# Patient Record
Sex: FEMALE | Race: WHITE | NOT HISPANIC OR LATINO | Employment: OTHER | URBAN - METROPOLITAN AREA
[De-identification: names, ages, dates, MRNs, and addresses within clinical notes are randomized per-mention and may not be internally consistent; named-entity substitution may affect disease eponyms.]

---

## 2024-06-01 ENCOUNTER — MEDICAL CORRESPONDENCE (OUTPATIENT)
Dept: HEALTH INFORMATION MANAGEMENT | Facility: CLINIC | Age: 64
End: 2024-06-01

## 2024-06-04 ENCOUNTER — TRANSFERRED RECORDS (OUTPATIENT)
Dept: HEALTH INFORMATION MANAGEMENT | Facility: CLINIC | Age: 64
End: 2024-06-04
Payer: COMMERCIAL

## 2024-06-26 NOTE — OR NURSING
Pt concerned about cost of outpatient procedure vs outpatient in bed-staying overnight, instructed pt to call Dr. Henry's office to discuss options.  Pt states she had Dr. Henry's office number.

## 2024-06-28 RX ORDER — CALCIUM CARBONATE/VITAMIN D3 500-10/5ML
1 LIQUID (ML) ORAL DAILY
COMMUNITY

## 2024-06-28 RX ORDER — GABAPENTIN 300 MG/1
600 CAPSULE ORAL AT BEDTIME
COMMUNITY

## 2024-06-28 RX ORDER — SERTRALINE HYDROCHLORIDE 100 MG/1
100 TABLET, FILM COATED ORAL DAILY
COMMUNITY

## 2024-06-28 RX ORDER — ALPRAZOLAM 0.25 MG
0.25 TABLET ORAL 2 TIMES DAILY PRN
COMMUNITY

## 2024-06-28 RX ORDER — ROSUVASTATIN CALCIUM 20 MG/1
20 TABLET, COATED ORAL EVERY EVENING
COMMUNITY

## 2024-07-01 ENCOUNTER — HOSPITAL ENCOUNTER (OUTPATIENT)
Facility: CLINIC | Age: 64
Discharge: HOME | End: 2024-07-02
Attending: ORTHOPAEDIC SURGERY | Admitting: ORTHOPAEDIC SURGERY
Payer: COMMERCIAL

## 2024-07-01 ENCOUNTER — ANESTHESIA EVENT (OUTPATIENT)
Dept: SURGERY | Facility: CLINIC | Age: 64
End: 2024-07-01
Payer: COMMERCIAL

## 2024-07-01 ENCOUNTER — ANESTHESIA (OUTPATIENT)
Dept: SURGERY | Facility: CLINIC | Age: 64
End: 2024-07-01
Payer: COMMERCIAL

## 2024-07-01 ENCOUNTER — APPOINTMENT (OUTPATIENT)
Dept: GENERAL RADIOLOGY | Facility: CLINIC | Age: 64
End: 2024-07-01
Attending: ORTHOPAEDIC SURGERY
Payer: COMMERCIAL

## 2024-07-01 DIAGNOSIS — Z98.1 S/P CERVICAL SPINAL FUSION: Primary | ICD-10-CM

## 2024-07-01 LAB
GLUCOSE BLDC GLUCOMTR-MCNC: 135 MG/DL (ref 70–99)
GLUCOSE BLDC GLUCOMTR-MCNC: 146 MG/DL (ref 70–99)
GLUCOSE BLDC GLUCOMTR-MCNC: 253 MG/DL (ref 70–99)
HBA1C MFR BLD: 6.8 %

## 2024-07-01 PROCEDURE — 370N000017 HC ANESTHESIA TECHNICAL FEE, PER MIN: Performed by: ORTHOPAEDIC SURGERY

## 2024-07-01 PROCEDURE — 250N000011 HC RX IP 250 OP 636: Performed by: ORTHOPAEDIC SURGERY

## 2024-07-01 PROCEDURE — 250N000009 HC RX 250: Performed by: ORTHOPAEDIC SURGERY

## 2024-07-01 PROCEDURE — 250N000025 HC SEVOFLURANE, PER MIN: Performed by: ORTHOPAEDIC SURGERY

## 2024-07-01 PROCEDURE — 22551 ARTHRD ANT NTRBDY CERVICAL: CPT | Performed by: NURSE ANESTHETIST, CERTIFIED REGISTERED

## 2024-07-01 PROCEDURE — 99232 SBSQ HOSP IP/OBS MODERATE 35: CPT | Performed by: INTERNAL MEDICINE

## 2024-07-01 PROCEDURE — C1713 ANCHOR/SCREW BN/BN,TIS/BN: HCPCS | Performed by: ORTHOPAEDIC SURGERY

## 2024-07-01 PROCEDURE — 360N000085 HC SURGERY LEVEL 5 W/ FLUORO, PER MIN: Performed by: ORTHOPAEDIC SURGERY

## 2024-07-01 PROCEDURE — 250N000011 HC RX IP 250 OP 636: Performed by: ANESTHESIOLOGY

## 2024-07-01 PROCEDURE — 250N000011 HC RX IP 250 OP 636: Performed by: STUDENT IN AN ORGANIZED HEALTH CARE EDUCATION/TRAINING PROGRAM

## 2024-07-01 PROCEDURE — 710N000009 HC RECOVERY PHASE 1, LEVEL 1, PER MIN: Performed by: ORTHOPAEDIC SURGERY

## 2024-07-01 PROCEDURE — 272N000002 HC OR SUPPLY OTHER OPNP: Performed by: ORTHOPAEDIC SURGERY

## 2024-07-01 PROCEDURE — 250N000013 HC RX MED GY IP 250 OP 250 PS 637: Performed by: STUDENT IN AN ORGANIZED HEALTH CARE EDUCATION/TRAINING PROGRAM

## 2024-07-01 PROCEDURE — 258N000003 HC RX IP 258 OP 636: Performed by: ANESTHESIOLOGY

## 2024-07-01 PROCEDURE — 22551 ARTHRD ANT NTRBDY CERVICAL: CPT | Performed by: ANESTHESIOLOGY

## 2024-07-01 PROCEDURE — 36415 COLL VENOUS BLD VENIPUNCTURE: CPT | Performed by: INTERNAL MEDICINE

## 2024-07-01 PROCEDURE — 250N000012 HC RX MED GY IP 250 OP 636 PS 637: Performed by: INTERNAL MEDICINE

## 2024-07-01 PROCEDURE — 272N000001 HC OR GENERAL SUPPLY STERILE: Performed by: ORTHOPAEDIC SURGERY

## 2024-07-01 PROCEDURE — 82962 GLUCOSE BLOOD TEST: CPT

## 2024-07-01 PROCEDURE — 250N000013 HC RX MED GY IP 250 OP 250 PS 637: Performed by: ORTHOPAEDIC SURGERY

## 2024-07-01 PROCEDURE — 250N000011 HC RX IP 250 OP 636: Performed by: NURSE ANESTHETIST, CERTIFIED REGISTERED

## 2024-07-01 PROCEDURE — 999N000141 HC STATISTIC PRE-PROCEDURE NURSING ASSESSMENT: Performed by: ORTHOPAEDIC SURGERY

## 2024-07-01 PROCEDURE — 999N000179 XR SURGERY CARM FLUORO LESS THAN 5 MIN W STILLS

## 2024-07-01 PROCEDURE — 83036 HEMOGLOBIN GLYCOSYLATED A1C: CPT | Performed by: INTERNAL MEDICINE

## 2024-07-01 PROCEDURE — 250N000009 HC RX 250: Performed by: NURSE ANESTHETIST, CERTIFIED REGISTERED

## 2024-07-01 DEVICE — MAGNETOS EASYPACK PUTTY 1.5CC 1-2MM USA
Type: IMPLANTABLE DEVICE | Site: SPINE CERVICAL | Status: FUNCTIONAL
Brand: MAGNETOS

## 2024-07-01 DEVICE — IMPLANTABLE DEVICE: Type: IMPLANTABLE DEVICE | Site: SPINE CERVICAL | Status: FUNCTIONAL

## 2024-07-01 RX ORDER — OXYCODONE HYDROCHLORIDE 5 MG/1
5 TABLET ORAL
Status: DISCONTINUED | OUTPATIENT
Start: 2024-07-01 | End: 2024-07-01

## 2024-07-01 RX ORDER — FENTANYL CITRATE 50 UG/ML
50 INJECTION, SOLUTION INTRAMUSCULAR; INTRAVENOUS EVERY 5 MIN PRN
Status: DISCONTINUED | OUTPATIENT
Start: 2024-07-01 | End: 2024-07-01 | Stop reason: HOSPADM

## 2024-07-01 RX ORDER — NALOXONE HYDROCHLORIDE 0.4 MG/ML
0.2 INJECTION, SOLUTION INTRAMUSCULAR; INTRAVENOUS; SUBCUTANEOUS
Status: DISCONTINUED | OUTPATIENT
Start: 2024-07-01 | End: 2024-07-02 | Stop reason: HOSPADM

## 2024-07-01 RX ORDER — AMOXICILLIN 250 MG
1 CAPSULE ORAL 2 TIMES DAILY
Status: DISCONTINUED | OUTPATIENT
Start: 2024-07-01 | End: 2024-07-02 | Stop reason: HOSPADM

## 2024-07-01 RX ORDER — FENTANYL CITRATE 50 UG/ML
25 INJECTION, SOLUTION INTRAMUSCULAR; INTRAVENOUS EVERY 5 MIN PRN
Status: DISCONTINUED | OUTPATIENT
Start: 2024-07-01 | End: 2024-07-01 | Stop reason: HOSPADM

## 2024-07-01 RX ORDER — PROCHLORPERAZINE MALEATE 5 MG
5 TABLET ORAL EVERY 6 HOURS PRN
Status: DISCONTINUED | OUTPATIENT
Start: 2024-07-01 | End: 2024-07-02 | Stop reason: HOSPADM

## 2024-07-01 RX ORDER — POLYETHYLENE GLYCOL 3350 17 G/17G
17 POWDER, FOR SOLUTION ORAL DAILY
Status: DISCONTINUED | OUTPATIENT
Start: 2024-07-02 | End: 2024-07-02 | Stop reason: HOSPADM

## 2024-07-01 RX ORDER — HYDROMORPHONE HCL IN WATER/PF 6 MG/30 ML
0.4 PATIENT CONTROLLED ANALGESIA SYRINGE INTRAVENOUS
Status: DISCONTINUED | OUTPATIENT
Start: 2024-07-01 | End: 2024-07-02 | Stop reason: HOSPADM

## 2024-07-01 RX ORDER — ONDANSETRON 2 MG/ML
4 INJECTION INTRAMUSCULAR; INTRAVENOUS EVERY 30 MIN PRN
Status: DISCONTINUED | OUTPATIENT
Start: 2024-07-01 | End: 2024-07-01

## 2024-07-01 RX ORDER — SERTRALINE HYDROCHLORIDE 100 MG/1
100 TABLET, FILM COATED ORAL DAILY
Status: DISCONTINUED | OUTPATIENT
Start: 2024-07-01 | End: 2024-07-02 | Stop reason: HOSPADM

## 2024-07-01 RX ORDER — ONDANSETRON 2 MG/ML
4 INJECTION INTRAMUSCULAR; INTRAVENOUS EVERY 30 MIN PRN
Status: DISCONTINUED | OUTPATIENT
Start: 2024-07-01 | End: 2024-07-01 | Stop reason: HOSPADM

## 2024-07-01 RX ORDER — OXYCODONE HYDROCHLORIDE 5 MG/1
10 TABLET ORAL EVERY 4 HOURS PRN
Status: DISCONTINUED | OUTPATIENT
Start: 2024-07-01 | End: 2024-07-02 | Stop reason: HOSPADM

## 2024-07-01 RX ORDER — HYDROXYZINE HYDROCHLORIDE 10 MG/1
10 TABLET, FILM COATED ORAL EVERY 6 HOURS PRN
Status: DISCONTINUED | OUTPATIENT
Start: 2024-07-01 | End: 2024-07-02 | Stop reason: HOSPADM

## 2024-07-01 RX ORDER — DEXTROSE MONOHYDRATE 25 G/50ML
25-50 INJECTION, SOLUTION INTRAVENOUS
Status: DISCONTINUED | OUTPATIENT
Start: 2024-07-01 | End: 2024-07-02 | Stop reason: HOSPADM

## 2024-07-01 RX ORDER — DEXAMETHASONE SODIUM PHOSPHATE 4 MG/ML
4 INJECTION, SOLUTION INTRA-ARTICULAR; INTRALESIONAL; INTRAMUSCULAR; INTRAVENOUS; SOFT TISSUE
Status: DISCONTINUED | OUTPATIENT
Start: 2024-07-01 | End: 2024-07-01

## 2024-07-01 RX ORDER — PROPOFOL 10 MG/ML
INJECTION, EMULSION INTRAVENOUS PRN
Status: DISCONTINUED | OUTPATIENT
Start: 2024-07-01 | End: 2024-07-01

## 2024-07-01 RX ORDER — OXYCODONE HYDROCHLORIDE 5 MG/1
5 TABLET ORAL EVERY 4 HOURS PRN
Status: DISCONTINUED | OUTPATIENT
Start: 2024-07-01 | End: 2024-07-02 | Stop reason: HOSPADM

## 2024-07-01 RX ORDER — SODIUM CHLORIDE, SODIUM LACTATE, POTASSIUM CHLORIDE, CALCIUM CHLORIDE 600; 310; 30; 20 MG/100ML; MG/100ML; MG/100ML; MG/100ML
INJECTION, SOLUTION INTRAVENOUS CONTINUOUS
Status: DISCONTINUED | OUTPATIENT
Start: 2024-07-01 | End: 2024-07-01 | Stop reason: HOSPADM

## 2024-07-01 RX ORDER — GABAPENTIN 100 MG/1
100 CAPSULE ORAL
Status: COMPLETED | OUTPATIENT
Start: 2024-07-01 | End: 2024-07-01

## 2024-07-01 RX ORDER — HYDRALAZINE HYDROCHLORIDE 20 MG/ML
5 INJECTION INTRAMUSCULAR; INTRAVENOUS ONCE
Status: COMPLETED | OUTPATIENT
Start: 2024-07-01 | End: 2024-07-01

## 2024-07-01 RX ORDER — HYDROMORPHONE HCL IN WATER/PF 6 MG/30 ML
0.4 PATIENT CONTROLLED ANALGESIA SYRINGE INTRAVENOUS EVERY 5 MIN PRN
Status: DISCONTINUED | OUTPATIENT
Start: 2024-07-01 | End: 2024-07-01 | Stop reason: HOSPADM

## 2024-07-01 RX ORDER — CEFAZOLIN SODIUM 2 G/100ML
2 INJECTION, SOLUTION INTRAVENOUS EVERY 8 HOURS
Status: COMPLETED | OUTPATIENT
Start: 2024-07-01 | End: 2024-07-02

## 2024-07-01 RX ORDER — SODIUM CHLORIDE 9 MG/ML
INJECTION, SOLUTION INTRAVENOUS CONTINUOUS
Status: DISCONTINUED | OUTPATIENT
Start: 2024-07-01 | End: 2024-07-02 | Stop reason: HOSPADM

## 2024-07-01 RX ORDER — ACETAMINOPHEN 325 MG/1
650 TABLET ORAL EVERY 4 HOURS PRN
Status: DISCONTINUED | OUTPATIENT
Start: 2024-07-04 | End: 2024-07-02 | Stop reason: HOSPADM

## 2024-07-01 RX ORDER — ROSUVASTATIN CALCIUM 20 MG/1
20 TABLET, COATED ORAL EVERY EVENING
Status: DISCONTINUED | OUTPATIENT
Start: 2024-07-01 | End: 2024-07-02 | Stop reason: HOSPADM

## 2024-07-01 RX ORDER — CEFAZOLIN SODIUM/WATER 2 G/20 ML
2 SYRINGE (ML) INTRAVENOUS
Status: COMPLETED | OUTPATIENT
Start: 2024-07-01 | End: 2024-07-01

## 2024-07-01 RX ORDER — DEXAMETHASONE SODIUM PHOSPHATE 4 MG/ML
INJECTION, SOLUTION INTRA-ARTICULAR; INTRALESIONAL; INTRAMUSCULAR; INTRAVENOUS; SOFT TISSUE PRN
Status: DISCONTINUED | OUTPATIENT
Start: 2024-07-01 | End: 2024-07-01

## 2024-07-01 RX ORDER — HYDROMORPHONE HCL IN WATER/PF 6 MG/30 ML
0.2 PATIENT CONTROLLED ANALGESIA SYRINGE INTRAVENOUS
Status: DISCONTINUED | OUTPATIENT
Start: 2024-07-01 | End: 2024-07-02 | Stop reason: HOSPADM

## 2024-07-01 RX ORDER — METHOCARBAMOL 500 MG/1
500 TABLET, FILM COATED ORAL EVERY 6 HOURS PRN
Status: DISCONTINUED | OUTPATIENT
Start: 2024-07-01 | End: 2024-07-02 | Stop reason: HOSPADM

## 2024-07-01 RX ORDER — HYDROMORPHONE HCL IN WATER/PF 6 MG/30 ML
0.2 PATIENT CONTROLLED ANALGESIA SYRINGE INTRAVENOUS EVERY 5 MIN PRN
Status: DISCONTINUED | OUTPATIENT
Start: 2024-07-01 | End: 2024-07-01 | Stop reason: HOSPADM

## 2024-07-01 RX ORDER — NALOXONE HYDROCHLORIDE 0.4 MG/ML
0.4 INJECTION, SOLUTION INTRAMUSCULAR; INTRAVENOUS; SUBCUTANEOUS
Status: DISCONTINUED | OUTPATIENT
Start: 2024-07-01 | End: 2024-07-02 | Stop reason: HOSPADM

## 2024-07-01 RX ORDER — OXYCODONE HYDROCHLORIDE 5 MG/1
10 TABLET ORAL
Status: DISCONTINUED | OUTPATIENT
Start: 2024-07-01 | End: 2024-07-01

## 2024-07-01 RX ORDER — DEXAMETHASONE SODIUM PHOSPHATE 10 MG/ML
10 INJECTION, SOLUTION INTRAMUSCULAR; INTRAVENOUS ONCE
Status: DISCONTINUED | OUTPATIENT
Start: 2024-07-01 | End: 2024-07-01 | Stop reason: HOSPADM

## 2024-07-01 RX ORDER — ONDANSETRON 2 MG/ML
INJECTION INTRAMUSCULAR; INTRAVENOUS PRN
Status: DISCONTINUED | OUTPATIENT
Start: 2024-07-01 | End: 2024-07-01

## 2024-07-01 RX ORDER — ACETAMINOPHEN 325 MG/1
975 TABLET ORAL EVERY 8 HOURS
Status: DISCONTINUED | OUTPATIENT
Start: 2024-07-01 | End: 2024-07-02 | Stop reason: HOSPADM

## 2024-07-01 RX ORDER — BISACODYL 10 MG
10 SUPPOSITORY, RECTAL RECTAL DAILY PRN
Status: DISCONTINUED | OUTPATIENT
Start: 2024-07-01 | End: 2024-07-02 | Stop reason: HOSPADM

## 2024-07-01 RX ORDER — LIDOCAINE 40 MG/G
CREAM TOPICAL
Status: DISCONTINUED | OUTPATIENT
Start: 2024-07-01 | End: 2024-07-02 | Stop reason: HOSPADM

## 2024-07-01 RX ORDER — DEXAMETHASONE SODIUM PHOSPHATE 4 MG/ML
4 INJECTION, SOLUTION INTRA-ARTICULAR; INTRALESIONAL; INTRAMUSCULAR; INTRAVENOUS; SOFT TISSUE
Status: DISCONTINUED | OUTPATIENT
Start: 2024-07-01 | End: 2024-07-01 | Stop reason: HOSPADM

## 2024-07-01 RX ORDER — PROPOFOL 10 MG/ML
INJECTION, EMULSION INTRAVENOUS CONTINUOUS PRN
Status: DISCONTINUED | OUTPATIENT
Start: 2024-07-01 | End: 2024-07-01

## 2024-07-01 RX ORDER — ONDANSETRON 4 MG/1
4 TABLET, ORALLY DISINTEGRATING ORAL EVERY 30 MIN PRN
Status: DISCONTINUED | OUTPATIENT
Start: 2024-07-01 | End: 2024-07-01 | Stop reason: HOSPADM

## 2024-07-01 RX ORDER — ONDANSETRON 4 MG/1
4 TABLET, ORALLY DISINTEGRATING ORAL EVERY 30 MIN PRN
Status: DISCONTINUED | OUTPATIENT
Start: 2024-07-01 | End: 2024-07-01

## 2024-07-01 RX ORDER — FENTANYL CITRATE 50 UG/ML
INJECTION, SOLUTION INTRAMUSCULAR; INTRAVENOUS PRN
Status: DISCONTINUED | OUTPATIENT
Start: 2024-07-01 | End: 2024-07-01

## 2024-07-01 RX ORDER — ONDANSETRON 2 MG/ML
4 INJECTION INTRAMUSCULAR; INTRAVENOUS EVERY 6 HOURS PRN
Status: DISCONTINUED | OUTPATIENT
Start: 2024-07-01 | End: 2024-07-02 | Stop reason: HOSPADM

## 2024-07-01 RX ORDER — NALOXONE HYDROCHLORIDE 0.4 MG/ML
0.1 INJECTION, SOLUTION INTRAMUSCULAR; INTRAVENOUS; SUBCUTANEOUS
Status: DISCONTINUED | OUTPATIENT
Start: 2024-07-01 | End: 2024-07-01 | Stop reason: HOSPADM

## 2024-07-01 RX ORDER — CEFAZOLIN SODIUM/WATER 2 G/20 ML
2 SYRINGE (ML) INTRAVENOUS SEE ADMIN INSTRUCTIONS
Status: DISCONTINUED | OUTPATIENT
Start: 2024-07-01 | End: 2024-07-01 | Stop reason: HOSPADM

## 2024-07-01 RX ORDER — MAGNESIUM HYDROXIDE/ALUMINUM HYDROXICE/SIMETHICONE 120; 1200; 1200 MG/30ML; MG/30ML; MG/30ML
30 SUSPENSION ORAL EVERY 4 HOURS PRN
Status: DISCONTINUED | OUTPATIENT
Start: 2024-07-01 | End: 2024-07-02 | Stop reason: HOSPADM

## 2024-07-01 RX ORDER — LIDOCAINE 40 MG/G
CREAM TOPICAL
Status: DISCONTINUED | OUTPATIENT
Start: 2024-07-01 | End: 2024-07-01 | Stop reason: HOSPADM

## 2024-07-01 RX ORDER — LIDOCAINE HYDROCHLORIDE 20 MG/ML
INJECTION, SOLUTION INFILTRATION; PERINEURAL PRN
Status: DISCONTINUED | OUTPATIENT
Start: 2024-07-01 | End: 2024-07-01

## 2024-07-01 RX ORDER — ONDANSETRON 4 MG/1
4 TABLET, ORALLY DISINTEGRATING ORAL EVERY 6 HOURS PRN
Status: DISCONTINUED | OUTPATIENT
Start: 2024-07-01 | End: 2024-07-02 | Stop reason: HOSPADM

## 2024-07-01 RX ORDER — MAGNESIUM HYDROXIDE 1200 MG/15ML
LIQUID ORAL PRN
Status: DISCONTINUED | OUTPATIENT
Start: 2024-07-01 | End: 2024-07-01 | Stop reason: HOSPADM

## 2024-07-01 RX ORDER — DEXAMETHASONE SODIUM PHOSPHATE 4 MG/ML
4 INJECTION, SOLUTION INTRA-ARTICULAR; INTRALESIONAL; INTRAMUSCULAR; INTRAVENOUS; SOFT TISSUE EVERY 6 HOURS
Status: DISCONTINUED | OUTPATIENT
Start: 2024-07-01 | End: 2024-07-02 | Stop reason: HOSPADM

## 2024-07-01 RX ORDER — NICOTINE POLACRILEX 4 MG
15-30 LOZENGE BUCCAL
Status: DISCONTINUED | OUTPATIENT
Start: 2024-07-01 | End: 2024-07-02 | Stop reason: HOSPADM

## 2024-07-01 RX ORDER — NALOXONE HYDROCHLORIDE 0.4 MG/ML
0.1 INJECTION, SOLUTION INTRAMUSCULAR; INTRAVENOUS; SUBCUTANEOUS
Status: DISCONTINUED | OUTPATIENT
Start: 2024-07-01 | End: 2024-07-01

## 2024-07-01 RX ADMIN — FENTANYL CITRATE 50 MCG: 50 INJECTION INTRAMUSCULAR; INTRAVENOUS at 12:03

## 2024-07-01 RX ADMIN — SODIUM CHLORIDE, POTASSIUM CHLORIDE, SODIUM LACTATE AND CALCIUM CHLORIDE: 600; 310; 30; 20 INJECTION, SOLUTION INTRAVENOUS at 11:01

## 2024-07-01 RX ADMIN — SODIUM CHLORIDE, POTASSIUM CHLORIDE, SODIUM LACTATE AND CALCIUM CHLORIDE: 600; 310; 30; 20 INJECTION, SOLUTION INTRAVENOUS at 12:44

## 2024-07-01 RX ADMIN — DEXAMETHASONE SODIUM PHOSPHATE 4 MG: 4 INJECTION, SOLUTION INTRAMUSCULAR; INTRAVENOUS at 20:05

## 2024-07-01 RX ADMIN — Medication 2 G: at 11:01

## 2024-07-01 RX ADMIN — PROPOFOL 200 MG: 10 INJECTION, EMULSION INTRAVENOUS at 11:08

## 2024-07-01 RX ADMIN — DEXAMETHASONE SODIUM PHOSPHATE 10 MG: 4 INJECTION, SOLUTION INTRA-ARTICULAR; INTRALESIONAL; INTRAMUSCULAR; INTRAVENOUS; SOFT TISSUE at 11:13

## 2024-07-01 RX ADMIN — PROPOFOL 20 MCG/KG/MIN: 10 INJECTION, EMULSION INTRAVENOUS at 11:25

## 2024-07-01 RX ADMIN — HYDROMORPHONE HYDROCHLORIDE 0.2 MG: 0.2 INJECTION, SOLUTION INTRAMUSCULAR; INTRAVENOUS; SUBCUTANEOUS at 15:20

## 2024-07-01 RX ADMIN — HYDROMORPHONE HYDROCHLORIDE 0.2 MG: 0.2 INJECTION, SOLUTION INTRAMUSCULAR; INTRAVENOUS; SUBCUTANEOUS at 13:42

## 2024-07-01 RX ADMIN — SUGAMMADEX 180 MG: 100 INJECTION, SOLUTION INTRAVENOUS at 12:52

## 2024-07-01 RX ADMIN — ROCURONIUM BROMIDE 20 MG: 50 INJECTION, SOLUTION INTRAVENOUS at 12:01

## 2024-07-01 RX ADMIN — FENTANYL CITRATE 50 MCG: 50 INJECTION INTRAMUSCULAR; INTRAVENOUS at 11:44

## 2024-07-01 RX ADMIN — HYDROMORPHONE HYDROCHLORIDE 0.2 MG: 0.2 INJECTION, SOLUTION INTRAMUSCULAR; INTRAVENOUS; SUBCUTANEOUS at 13:23

## 2024-07-01 RX ADMIN — ROCURONIUM BROMIDE 50 MG: 50 INJECTION, SOLUTION INTRAVENOUS at 11:09

## 2024-07-01 RX ADMIN — GABAPENTIN 100 MG: 100 CAPSULE ORAL at 10:08

## 2024-07-01 RX ADMIN — HYDRALAZINE HYDROCHLORIDE 5 MG: 20 INJECTION INTRAMUSCULAR; INTRAVENOUS at 14:37

## 2024-07-01 RX ADMIN — HYDRALAZINE HYDROCHLORIDE 5 MG: 20 INJECTION INTRAMUSCULAR; INTRAVENOUS at 14:51

## 2024-07-01 RX ADMIN — METHOCARBAMOL 500 MG: 500 TABLET ORAL at 21:11

## 2024-07-01 RX ADMIN — ACETAMINOPHEN 975 MG: 325 TABLET, FILM COATED ORAL at 18:08

## 2024-07-01 RX ADMIN — ONDANSETRON 4 MG: 2 INJECTION INTRAMUSCULAR; INTRAVENOUS at 12:39

## 2024-07-01 RX ADMIN — OXYCODONE HYDROCHLORIDE 5 MG: 5 TABLET ORAL at 21:11

## 2024-07-01 RX ADMIN — FENTANYL CITRATE 100 MCG: 50 INJECTION INTRAMUSCULAR; INTRAVENOUS at 11:08

## 2024-07-01 RX ADMIN — OXYCODONE HYDROCHLORIDE 5 MG: 5 TABLET ORAL at 20:05

## 2024-07-01 RX ADMIN — SENNOSIDES AND DOCUSATE SODIUM 1 TABLET: 50; 8.6 TABLET ORAL at 20:05

## 2024-07-01 RX ADMIN — MIDAZOLAM 2 MG: 1 INJECTION INTRAMUSCULAR; INTRAVENOUS at 11:01

## 2024-07-01 RX ADMIN — LIDOCAINE HYDROCHLORIDE 60 MG: 20 INJECTION, SOLUTION INFILTRATION; PERINEURAL at 11:08

## 2024-07-01 RX ADMIN — CEFAZOLIN SODIUM 2 G: 2 INJECTION, SOLUTION INTRAVENOUS at 18:07

## 2024-07-01 RX ADMIN — INSULIN GLARGINE 25 UNITS: 100 INJECTION, SOLUTION SUBCUTANEOUS at 21:14

## 2024-07-01 RX ADMIN — ROSUVASTATIN CALCIUM 20 MG: 20 TABLET, FILM COATED ORAL at 20:05

## 2024-07-01 RX ADMIN — ROCURONIUM BROMIDE 20 MG: 50 INJECTION, SOLUTION INTRAVENOUS at 11:38

## 2024-07-01 ASSESSMENT — ACTIVITIES OF DAILY LIVING (ADL)
ADLS_ACUITY_SCORE: 21
ADLS_ACUITY_SCORE: 20
ADLS_ACUITY_SCORE: 20
ADLS_ACUITY_SCORE: 21
ADLS_ACUITY_SCORE: 20
ADLS_ACUITY_SCORE: 35
ADLS_ACUITY_SCORE: 20
ADLS_ACUITY_SCORE: 21
ADLS_ACUITY_SCORE: 20
ADLS_ACUITY_SCORE: 21
ADLS_ACUITY_SCORE: 20

## 2024-07-01 ASSESSMENT — ENCOUNTER SYMPTOMS: SEIZURES: 0

## 2024-07-01 ASSESSMENT — LIFESTYLE VARIABLES: TOBACCO_USE: 0

## 2024-07-01 NOTE — ANESTHESIA POSTPROCEDURE EVALUATION
Patient: Mago Garzon    Procedure: Procedure(s):  Cervical 5 to Cervical 6 Anterior Cervical Discectomy and Fusion       Anesthesia Type:  General    Note:  Disposition: Outpatient   Postop Pain Control: Uneventful            Sign Out: Well controlled pain   PONV: No   Neuro/Psych: Uneventful            Sign Out: Acceptable/Baseline neuro status   Airway/Respiratory: Uneventful            Sign Out: Acceptable/Baseline resp. status   CV/Hemodynamics: Uneventful            Sign Out: Acceptable CV status; No obvious hypovolemia; No obvious fluid overload   Other NRE: NONE   DID A NON-ROUTINE EVENT OCCUR? No           Last vitals:  Vitals Value Taken Time   /72 07/01/24 1352   Temp 36.1  C (96.9  F) 07/01/24 1345   Pulse 65 07/01/24 1356   Resp 13 07/01/24 1356   SpO2 93 % 07/01/24 1356   Vitals shown include unfiled device data.    Electronically Signed By: Eugenio Bryson MD  July 1, 2024  1:57 PM

## 2024-07-01 NOTE — PROGRESS NOTES
"[facility]    Hospitalist Progress Note    Date of Service (when I saw the patient): 07/01/2024  Admit date: 7/1/2024    Interval History   Chart check for med reconciliation with formal consult by our team tomorrow. Call if questions arise prior.     Assessment & Plan   Mago Garzon  is a 63 yo woman with history of type II diabetes on glargine twice daily Jardiance 10 mg daily, semaglutide once weekly who is s/p cervical 5 to cervical 6 anterior cervical discectomy and fusion.  We were consulted for diabetic management.  I have reconciled her diabetic medications and someone in our team will follow-up tomorrow.     S/p cervical 5 to cervical 6 anterior cervical discectomy and fusion on 07/01/24 -  hemodynamically stable. No complications reported.   Routine post-operative care, including pain mgt, drain cares, activity, and DVT prophylaxis, per surgical team.   Surgery currently holding PTA gabapentin and Voltaren gel  BMP and hgb ordered in AM  Please limit decadron as soon as possible given type II DM.     Type II DM, well controlled on insulin  *Per report recent A1c 7.1  Changed to diabetic diet  Note: placed on decadron 4 mg q 6 hours q 6 hours.   Medium sliding scale insulin ordered  Continue PTA Lantus dose 18 units in the PM, 25 units in the PM.    Holding Jardiance and Ozempic    Recent Labs   Lab 07/01/24  1442 07/01/24  0926   * 135*     Osteoarthritis, cervical stenosis  Note surgery holding PTA gabapentin and PTA Voltaren gel please reevaluate this tomorrow    Depression/anxiety  Agree with continuing Zoloft at 100 mg daily   PTA alprazolam twice daily PRN for anxiety has been held by surgery    Hyperlipidemia  Continue PTA rosuvastatin      Clinically Significant Risk Factors Present on Admission                              # Obesity: Estimated body mass index is 34.7 kg/m  as calculated from the following:    Height as of this encounter: 1.6 m (5' 3\").    Weight " as of this encounter: 88.9 kg (195 lb 14.4 oz).                Diet: Orders Placed This Encounter      Combination Diet Moderate Consistent Carb (60 g CHO per Meal) Diet     IVF: NS at 100 ml/h, stop when taking good PO  DVT Prophylaxis: Per surgical team  Julien Catheter: Not present    Full Code  Disposition:  Anticipate discharge per primary  PT/OT per primary      Trixie Hardy MD    Hospitalist Service  [facility]  Securely message with the Vocera Web Console (learn more here)  Text page via [employer] Paging/Directory      -Data reviewed today: I reviewed all new labs and imaging results over the last 24 hours. I personally reviewed no images or EKG's today.    Physical Exam   Temp: 97.6  F (36.4  C) Temp src: Axillary BP: (!) 165/84 Pulse: 78   Resp: 18 SpO2: 97 % O2 Device: Nasal cannula Oxygen Delivery: 4 LPM  Vitals:    07/01/24 0853   Weight: 88.9 kg (195 lb 14.4 oz)     Vital Signs with Ranges  Temp:  [96.7  F (35.9  C)-97.6  F (36.4  C)] 97.6  F (36.4  C)  Pulse:  [58-78] 78  Resp:  [10-20] 18  BP: (119-179)/(68-93) 165/84  SpO2:  [91 %-97 %] 97 %  I/O last 3 completed shifts:  In: 1150 [P.O.:50; I.V.:1100]  Out: 10 [Blood:10]      Medications   All medications reviewed on 07/01/24    Current Facility-Administered Medications   Medication Dose Route Frequency Provider Last Rate Last Admin    sodium chloride 0.9 % infusion   Intravenous Continuous Janki Friedman PA-C         Current Facility-Administered Medications   Medication Dose Route Frequency Provider Last Rate Last Admin    acetaminophen (TYLENOL) tablet 975 mg  975 mg Oral Q8H Janki Friedman PA-C        ceFAZolin (ANCEF) 2 g in 100 mL D5W intermittent infusion  2 g Intravenous Q8H Janki Friedman PA-C        dexAMETHasone (DECADRON) injection 4 mg  4 mg Intravenous Q6H Janki Friedman PA-C        [Held by provider] empagliflozin (JARDIANCE) tablet 10 mg  10 mg Oral Daily Trixie Hardy MD         insulin aspart (NovoLOG) injection (RAPID ACTING)  1-7 Units Subcutaneous TID AC Trixie Hardy MD        insulin aspart (NovoLOG) injection (RAPID ACTING)  1-5 Units Subcutaneous At Bedtime Trixie Hardy MD        insulin aspart (NovoLOG) injection (RAPID ACTING)   Subcutaneous TID w/meals Trixie Hardy MD        [START ON 7/2/2024] insulin glargine (LANTUS PEN) injection 18 Units  18 Units Subcutaneous QAM AC Trixie Hardy MD        insulin glargine (LANTUS PEN) injection 25 Units  25 Units Subcutaneous At Bedtime Trixie Hardy MD        [START ON 7/2/2024] polyethylene glycol (MIRALAX) Packet 17 g  17 g Oral Daily Janki Friedman PA-C        rosuvastatin (CRESTOR) tablet 20 mg  20 mg Oral Daily Janki Friedman PA-C        senna-docusate (SENOKOT-S/PERICOLACE) 8.6-50 MG per tablet 1 tablet  1 tablet Oral BID Janki Friedman PA-C        sertraline (ZOLOFT) tablet 100 mg  100 mg Oral Daily Janki Friedman PA-C        sodium chloride (PF) 0.9% PF flush 3 mL  3 mL Intracatheter Q8H Janki Friedman PA-C         PRN Meds:   Current Facility-Administered Medications   Medication Dose Route Frequency Provider Last Rate Last Admin    [START ON 7/4/2024] acetaminophen (TYLENOL) tablet 650 mg  650 mg Oral Q4H PRN Janki Friedman PA-C        alum & mag hydroxide-simethicone (MAALOX) suspension 30 mL  30 mL Oral Q4H PRN Janki Friedman PA-C        bisacodyl (DULCOLAX) suppository 10 mg  10 mg Rectal Daily PRN Janki Friedman PA-C        glucose gel 15-30 g  15-30 g Oral Q15 Min PRN Trixie Hardy MD        Or    dextrose 50 % injection 25-50 mL  25-50 mL Intravenous Q15 Min PRN Trixie Hardy MD        Or    glucagon injection 1 mg  1 mg Subcutaneous Q15 Min PRN Trixie Hardy MD        HYDROmorphone (DILAUDID) injection 0.2 mg  0.2 mg Intravenous Q2H PRN Janki Friedman PA-C        Or    HYDROmorphone (DILAUDID) injection 0.4 mg  0.4 mg Intravenous Q2H  PRN Janki Friedman PA-C        hydrOXYzine HCl (ATARAX) tablet 10 mg  10 mg Oral Q6H PRN Janki Friedman PA-C        lidocaine (LMX4) cream   Topical Q1H PRN Janki Friedman PA-C        lidocaine 1 % 0.1-1 mL  0.1-1 mL Other Q1H PRN Janik Friedman PA-C        magnesium hydroxide (MILK OF MAGNESIA) suspension 30 mL  30 mL Oral Daily PRN Janki Friedman PA-C        methocarbamol (ROBAXIN) tablet 500 mg  500 mg Oral Q6H PRN Janki Friedman PA-C        naloxone (NARCAN) injection 0.2 mg  0.2 mg Intravenous Q2 Min PRN Hoiness, Seema, RPH        Or    naloxone (NARCAN) injection 0.4 mg  0.4 mg Intravenous Q2 Min PRN Hoiness, Seema, RPH        Or    naloxone (NARCAN) injection 0.2 mg  0.2 mg Intramuscular Q2 Min PRN Hoiness, Seema, RPH        Or    naloxone (NARCAN) injection 0.4 mg  0.4 mg Intramuscular Q2 Min PRN Hoiness, Seema, RPH        ondansetron (ZOFRAN ODT) ODT tab 4 mg  4 mg Oral Q6H PRN Janki Friedman PA-C        Or    ondansetron (ZOFRAN) injection 4 mg  4 mg Intravenous Q6H PRN Janki Friedman PA-C        oxyCODONE (ROXICODONE) tablet 5 mg  5 mg Oral Q4H PRN Janki Friedman PA-C        Or    oxyCODONE (ROXICODONE) tablet 10 mg  10 mg Oral Q4H PRN Janki Friedman PA-C        prochlorperazine (COMPAZINE) injection 5 mg  5 mg Intravenous Q6H PRN Janki Friedman PA-C        Or    prochlorperazine (COMPAZINE) tablet 5 mg  5 mg Oral Q6H PRN Janki Friedman PA-C        sodium chloride (PF) 0.9% PF flush 3 mL  3 mL Intracatheter q1 min prn Janki Friedman PA-C           Data   Recent Labs   Lab 07/01/24  1442 07/01/24  0926   * 135*       No results found for this or any previous visit (from the past 24 hour(s)).

## 2024-07-01 NOTE — OR NURSING
Dr. Lieberman notified patient condition regarding /93 after Hydralazine 10mg total given. OK to transfer to ortho/spine. Patient states pain a 2 on 1 to 10 scale. Sipping on water and tolerates well. Neuro's intact. Neck dressing clean, dry and intact. Ice applied. Capno monitor on, EtCO2 40  with IPI 8

## 2024-07-01 NOTE — OR NURSING
Report given to Cindy BURCH on ortho/spine. Sister Eden updated. Patient transferred with oxygen/capno monitor. One bag of personal belongings.

## 2024-07-01 NOTE — PHARMACY-ADMISSION MEDICATION HISTORY
Pharmacist Admission Medication History    Admission medication history is complete. The information provided in this note is only as accurate as the sources available at the time of the update.    Information Source(s): Patient and CareEverywhere/SureScripts via in-person    Pertinent Information: She is weaning off gabapentin, currently down to 600mg in evening and plans to discontinue altogether after procedure.  -Verified no longer taking levothyroxine  -Cannabis is not medical cannabis program, it is purchased on her own.  -She was previously prescribed sertraline 125mg daily but she is only taking 100mg daily    Changes made to PTA medication list:  Added: None  Deleted: None  Changed: gabapentin to 600mg in evening, diclofenac to prn    Medication History Completed By: Seema Leslie [facility] 7/1/2024 6:02 PM    PTA Med List   Medication Sig Last Dose    ALPRAZolam (XANAX) 0.25 MG tablet Take 0.25 mg by mouth 2 times daily as needed for anxiety More than a month    diclofenac (VOLTAREN) 1 % topical gel Apply topically 4 times daily as needed 6/30/2024    empagliflozin (JARDIANCE) 10 MG TABS tablet Take 10 mg by mouth daily 6/30/2024    gabapentin (NEURONTIN) 300 MG capsule Take 600 mg by mouth at bedtime 6/30/2024 at hs-weaning off    insulin glargine (LANTUS PEN) 100 UNIT/ML pen Inject 18 Units Subcutaneous every morning 7/1/2024 at am    insulin glargine (LANTUS PEN) 100 UNIT/ML pen Inject 25 Units Subcutaneous at bedtime 6/30/2024 at pm    magnesium oxide 400 MG CAPS Take 1 capsule by mouth daily Past Week    medical cannabis (Patient's own supply) Take 1 Dose by mouth See Admin Instructions (The purpose of this order is to document that the patient reports taking medical cannabis.  This is not a prescription, and is not used to certify that the patient has a qualifying medical condition.) 6/30/2024 at Not Medical    rosuvastatin (CRESTOR) 20 MG tablet Take 20 mg by mouth every evening 6/30/2024     Semaglutide, 1 MG/DOSE, (OZEMPIC) 4 MG/3ML pen Inject 1 mg Subcutaneous every 7 days 6/21/2024    sertraline (ZOLOFT) 100 MG tablet Take 100 mg by mouth daily 6/30/2024

## 2024-07-01 NOTE — OP NOTE
Orthopedic Surgery Operative Report    Patient:   Mago Garzon  MRN:   [id number]   :  1960  Facility: [facility]   Date:  24         PREOPERATIVE DIAGNOSIS:    Bilateral C6 radiculopathy due to foraminal stenosis C5-6    POSTOPERATIVE DIAGNOSIS:    Bilateral C6 radiculopathy due to foraminal stenosis C5-6    PROCEDURE PERFORMED:    Anterior cervical discectomy and fusion C5-6  Application of anterior spinal plate C5-6  Placement of interbody cage C5-6  Use of nonstructural allograft combined with local autograft    SURGEON:    Joey Henry MD    SURGICAL ASSISTANT:    Janki Friedman PA-C     ANESTHESIA:  General    FINDINGS:    Severe foraminal stenosis bilateral C5-6, decompressed at case conclusion.  Scarring of the PLL to the thecal sac centrally.    COMPLICATIONS:     None    SPECIMENS:    None    ESTIMATED BLOOD LOSS:  10 ml    IMPLANTS:    Nuvasive Cohere interbody cage:  16 mm x 14 mm, 7 degree, 7 mm height  Nuvasive ACP 1.6V 18 mm anterior cervical plate with 3.5 mm screws  MagnetOS nonstructural graft     INDICATION FOR OPERATION:  The patient is a 64 year old female who developed bilateral radicular symptoms related to the above diagnosis.  Conservative measures were not effective in controlling her symptoms.  I discussed with her the risks, benefits, and alternatives of the above operation and she wished to proceed.    DESCRIPTION OF PROCEDURE:    The patient was met in the preoperative holding area and the operative site was confirmed and marked.  We once again reviewed the risks, benefits, and alternatives of the operation and the patient wished to proceed with the surgery.    The patient was taken back to the operating room and induced under general anesthesia.  The patient was positioned supine with all bony prominences well padded.  The surgical site was prepped and draped in the usual standard fashion.    I radiographically localized an appropriate  site for the incision with a spinal needle laid on the skin.  I then incised the skin transversely in a skin fold on the right side of the neck medial to the sternocleidomastoid and performed a standard Card-Tamayo approach.  I undermined the platysma and then incised it transversely.  There was a large and very prominent external jugular vein present underneath the platysma, however after dissecting it free this could be easily retracted medially to access the appropriate Card-Tamayo plane.  I then opened the fascia medial to the sternocleidomastoid and then bluntly dissected down to the anterior spine, taking great care to make sure that the esophagus and trachea were not damaged medially, and the carotid sheath remained lateral to me.  I did not encounter the recurrent laryngeal nerve in the field.  After retracting the esophagus medially, I cleared off the tissue in the midline of the spine, thereby exposing the vertebral bodies and disc space.  I placed a snap on the annulus of the exposed disc, on x-ray this was confirmed to actually be the C6-7 level.  I carried my dissection cranially to C5-6 and then radiographically confirmed the correct C5-6 level, with two independent OR personnel also noting they counted this as the correct level.  I then continued my exposure to the vertebral body above and below the operative disc C5-6.  I elevated the longus coli bilaterally and placed my Trimline retractor below them.    I placed Croydon pins into the vertebral bodies adjacent to the disc and distracted across the disc space.  I performed an annulotomy.  I then removed the disc with a combination of curettes and Kerrisons.  I used a bur along the posterior osteophytes to get down to the posterior longitudinal ligament.  I used a bernie also on the endplates to contour them appropriately for my cage.   I attempted to take down the posterior longitudinal ligament, however there were significant adhesions present  centrally and on the left-hand side.  I therefore was only able to take it down on the right-hand side.  I performed a foraminotomy on both sides to ensure adequate space for the nerve roots.  After the foraminotomies I could easily pass a nerve hook out the foramina without resistance, confirming adequate decompression.    I next trialed for an selected a Nuvasive Cohere porous PEEK interbody cage.  A 14 mm x 16 mm footprint 7 degree cage was most appropriate.  Cage height of 7 mm was most appropriate.  This cage was selected and packed with MagnetOS nonstructural graft combined with local autograft.  The cage was placed and found to have excellent stability with friction fit against the endplates.     I contoured the anterior osteophytes on the vertebral bodies to ensure that my plate would be able to sit flush.  I then selected an 18 mm Nuvasive ACP 1.6V plate and placed it on the anterior aspect of the operative vertebrae.  I used fluoroscopy to confirm appropriate position of this, and then placed screws into the vertebral bodies.  I deployed the locking mechanisms on the plate.    I achieved final hemostasis and thoroughly irrigated the surgical site.  I then began closure.  I closed the platysma with a running 3-0 Vicryl.  Subcutaneous tissues were closed with 4-0 Vicryl loosely, and then a running 4-0 Monocryl.  A sterile dressing and Tegaderm were placed over the incision.      The patient was allowed to emerge from anesthesia which occurred without incident and was transported to PACU in stable condition.      A surgical assistant was critical for this case to assist in retraction of the soft tissues and evacuating blood from the surgical field to facilitate a safer operation with improved visualization and less time under anesthesia.     All sponge and needle counts were correct at case conclusion.      POSTOPERATIVE PLAN:  -Activity:    Up with assist  -Weight Bearing Status: WBAT   -Bracing:   Soft  collar PRN, may remove for comfort as desired  -Antibiotics:   Ancef x24h  -Anticoagulation:  SCDs only  -Pain control:   IV and PO, wean to PO as able  -Dressing:   Ok to shower with dressing in place, dressing ok to get wet.  -Diet:    ADAT  -Imaging:   XR C spine prior to discharge    -Disposition:   Pending medical stability, PT, anticipate discharge POD 1  -Follow up:   2 weeks in my clinic          Joey Henry MD

## 2024-07-01 NOTE — ANESTHESIA PREPROCEDURE EVALUATION
"Anesthesia Pre-Procedure Evaluation    Patient: Mago Garzon   MRN: [id number] : 1960        Procedure : Procedure(s):  Cervical 5 to Cervical 6 Anterior Cervical Discectomy and Fusion          Past Medical History:   Diagnosis Date    Arthritis     Cervicalgia     Diabetes (H)     Hyperlipidemia     MDD (major depressive disorder)       Past Surgical History:   Procedure Laterality Date    ABDOMEN SURGERY          GYN SURGERY      D&C, Tubal Ligation,    HYSTERECTOMY        Allergies   Allergen Reactions    Metformin Nausea    Penicillins Hives      Social History     Tobacco Use    Smoking status: Former     Types: Cigarettes    Smokeless tobacco: Never   Substance Use Topics    Alcohol use: Yes     Comment: 5 drinks weekly      Wt Readings from Last 1 Encounters:   24 88.9 kg (195 lb 14.4 oz)        Anesthesia Evaluation   Pt has had prior anesthetic.     No history of anesthetic complications       ROS/MED HX  ENT/Pulmonary:    (-) tobacco use and sleep apnea   Neurologic:    (-) no seizures and no CVA   Cardiovascular:     (+) Dyslipidemia hypertension- -   -  - -                                      METS/Exercise Tolerance:     Hematologic:       Musculoskeletal:       GI/Hepatic:    (-) GERD and liver disease   Renal/Genitourinary:    (-) renal disease   Endo:     (+)  type II DM,   Using insulin,          Obesity,    (-) thyroid disease   Psychiatric/Substance Use:     (+) psychiatric history depression       Infectious Disease:       Malignancy:       Other:            Physical Exam    Airway        Mallampati: II   TM distance: > 3 FB   Neck ROM: full   Mouth opening: > 3 cm    Respiratory Devices and Support         Dental       (+) Minor Abnormalities - some fillings, tiny chips      Cardiovascular   cardiovascular exam normal          Pulmonary   pulmonary exam normal                OUTSIDE LABS:  CBC: No results found for: \"WBC\", \"HGB\", \"HCT\", \"PLT\"  BMP:   Lab Results " "  Component Value Date     (H) 07/01/2024     COAGS: No results found for: \"PTT\", \"INR\", \"FIBR\"  POC: No results found for: \"BGM\", \"HCG\", \"HCGS\"  HEPATIC: No results found for: \"ALBUMIN\", \"PROTTOTAL\", \"ALT\", \"AST\", \"GGT\", \"ALKPHOS\", \"BILITOTAL\", \"BILIDIRECT\", \"ISABELLA\"  OTHER: No results found for: \"PH\", \"LACT\", \"A1C\", \"COLIN\", \"PHOS\", \"MAG\", \"LIPASE\", \"AMYLASE\", \"TSH\", \"T4\", \"T3\", \"CRP\", \"SED\"    Anesthesia Plan    ASA Status:  2    NPO Status:  NPO Appropriate    Anesthesia Type: General.     - Airway: ETT   Induction: Intravenous.   Maintenance: Balanced.   Techniques and Equipment:     - Airway: Video-Laryngoscope       Consents    Anesthesia Plan(s) and associated risks, benefits, and realistic alternatives discussed. Questions answered and patient/representative(s) expressed understanding.     - Discussed:     - Discussed with:  Patient            Postoperative Care    Pain management: Multi-modal analgesia.   PONV prophylaxis: Ondansetron (or other 5HT-3), Dexamethasone or Solumedrol, Background Propofol Infusion     Comments:               Eugenio Bryson MD    I have reviewed the pertinent notes and labs in the chart from the past 30 days and (re)examined the patient.  Any updates or changes from those notes are reflected in this note.              # Obesity: Estimated body mass index is 34.7 kg/m  as calculated from the following:    Height as of this encounter: 1.6 m (5' 3\").    Weight as of this encounter: 88.9 kg (195 lb 14.4 oz).      "

## 2024-07-01 NOTE — ANESTHESIA PROCEDURE NOTES
Airway       Patient location during procedure: OR ([facility] - Operating Room or Procedural Area)       Procedure Start/Stop Times: 7/1/2024 11:10 AM  Staff -        CRNA: Jenny Thomas APRN CRNA       Performed By: CRNA  Consent for Airway        Urgency: elective  Indications and Patient Condition       Indications for airway management: yumi-procedural       Induction type:intravenous       Mask difficulty assessment: 1 - vent by mask    Final Airway Details       Final airway type: endotracheal airway       Successful airway: ETT - single  Endotracheal Airway Details        ETT size (mm): 7.0       Cuffed: yes       Cuff volume (mL): 8       Successful intubation technique: video laryngoscopy       VL Blade Size: Anderson 3       Grade View of Cords: 1       Adjucts: stylet       Position: Left       Measured from: gums/teeth       Secured at (cm): 21       Bite block used: None    Post intubation assessment        Number of attempts at approach: 1       Number of other approaches attempted: 0       Secured with: tape       Ease of procedure: easy       Dentition: Intact and Unchanged    Medication(s) Administered   Medication Administration Time: 7/1/2024 11:10 AM

## 2024-07-01 NOTE — ANESTHESIA CARE TRANSFER NOTE
Patient: Mago Garzon    Procedure: Procedure(s):  Cervical 5 to Cervical 6 Anterior Cervical Discectomy and Fusion       Diagnosis: Radiculopathy, cervical [M54.12]  Diagnosis Additional Information: No value filed.    Anesthesia Type:   General     Note:    Oropharynx: oropharynx clear of all foreign objects  Level of Consciousness: awake  Oxygen Supplementation: face mask  Level of Supplemental Oxygen (L/min / FiO2): 8  Independent Airway: airway patency satisfactory and stable  Dentition: dentition unchanged  Vital Signs Stable: post-procedure vital signs reviewed and stable  Report to RN Given: handoff report given  Patient transferred to: PACU    Handoff Report: Identifed the Patient, Identified the Reponsible Provider, Reviewed the pertinent medical history, Discussed the surgical course, Reviewed Intra-OP anesthesia mangement and issues during anesthesia, Set expectations for post-procedure period and Allowed opportunity for questions and acknowledgement of understanding      Vitals:  Vitals Value Taken Time   /85 07/01/24 1315   Temp 35.9  C (96.7  F) 07/01/24 1315   Pulse 65 07/01/24 1321   Resp 14 07/01/24 1321   SpO2 89 % 07/01/24 1321   Vitals shown include unfiled device data.    Electronically Signed By: MARJ Howard CRNA  July 1, 2024  1:22 PM

## 2024-07-02 ENCOUNTER — APPOINTMENT (OUTPATIENT)
Dept: GENERAL RADIOLOGY | Facility: CLINIC | Age: 64
End: 2024-07-02
Attending: STUDENT IN AN ORGANIZED HEALTH CARE EDUCATION/TRAINING PROGRAM
Payer: COMMERCIAL

## 2024-07-02 VITALS
TEMPERATURE: 98.1 F | SYSTOLIC BLOOD PRESSURE: 146 MMHG | WEIGHT: 195.9 LBS | OXYGEN SATURATION: 95 % | HEIGHT: 63 IN | HEART RATE: 68 BPM | RESPIRATION RATE: 18 BRPM | DIASTOLIC BLOOD PRESSURE: 80 MMHG | BODY MASS INDEX: 34.71 KG/M2

## 2024-07-02 LAB
ALBUMIN SERPL BCG-MCNC: 4.2 G/DL (ref 3.5–5.2)
ALP SERPL-CCNC: 85 U/L (ref 40–150)
ALT SERPL W P-5'-P-CCNC: 21 U/L (ref 0–50)
ANION GAP SERPL CALCULATED.3IONS-SCNC: 13 MMOL/L (ref 7–15)
AST SERPL W P-5'-P-CCNC: 16 U/L (ref 0–45)
BILIRUB DIRECT SERPL-MCNC: <0.2 MG/DL (ref 0–0.3)
BILIRUB SERPL-MCNC: 0.8 MG/DL
BUN SERPL-MCNC: 17.6 MG/DL (ref 8–23)
CALCIUM SERPL-MCNC: 9.4 MG/DL (ref 8.8–10.2)
CHLORIDE SERPL-SCNC: 101 MMOL/L (ref 98–107)
CREAT SERPL-MCNC: 0.76 MG/DL (ref 0.51–0.95)
DEPRECATED HCO3 PLAS-SCNC: 24 MMOL/L (ref 22–29)
EGFRCR SERPLBLD CKD-EPI 2021: 86 ML/MIN/1.73M2
ERYTHROCYTE [DISTWIDTH] IN BLOOD BY AUTOMATED COUNT: 13.1 % (ref 10–15)
FASTING STATUS PATIENT QL REPORTED: NO
FASTING STATUS PATIENT QL REPORTED: NO
GLUCOSE BLDC GLUCOMTR-MCNC: 207 MG/DL (ref 70–99)
GLUCOSE BLDC GLUCOMTR-MCNC: 233 MG/DL (ref 70–99)
GLUCOSE SERPL-MCNC: 288 MG/DL (ref 70–99)
GLUCOSE SERPL-MCNC: 288 MG/DL (ref 70–99)
HCT VFR BLD AUTO: 41.9 % (ref 35–47)
HGB BLD-MCNC: 13.9 G/DL (ref 11.7–15.7)
MCH RBC QN AUTO: 28.2 PG (ref 26.5–33)
MCHC RBC AUTO-ENTMCNC: 33.2 G/DL (ref 31.5–36.5)
MCV RBC AUTO: 85 FL (ref 78–100)
PLATELET # BLD AUTO: 208 10E3/UL (ref 150–450)
POTASSIUM SERPL-SCNC: 4.6 MMOL/L (ref 3.4–5.3)
PROT SERPL-MCNC: 7.2 G/DL (ref 6.4–8.3)
RBC # BLD AUTO: 4.93 10E6/UL (ref 3.8–5.2)
SODIUM SERPL-SCNC: 138 MMOL/L (ref 135–145)
WBC # BLD AUTO: 12.1 10E3/UL (ref 4–11)

## 2024-07-02 PROCEDURE — 82962 GLUCOSE BLOOD TEST: CPT

## 2024-07-02 PROCEDURE — 82248 BILIRUBIN DIRECT: CPT | Performed by: HOSPITALIST

## 2024-07-02 PROCEDURE — 999N000065 XR CERVICAL SPINE 2/3 VIEWS

## 2024-07-02 PROCEDURE — 85027 COMPLETE CBC AUTOMATED: CPT | Performed by: HOSPITALIST

## 2024-07-02 PROCEDURE — 80053 COMPREHEN METABOLIC PANEL: CPT | Performed by: STUDENT IN AN ORGANIZED HEALTH CARE EDUCATION/TRAINING PROGRAM

## 2024-07-02 PROCEDURE — 36415 COLL VENOUS BLD VENIPUNCTURE: CPT | Performed by: HOSPITALIST

## 2024-07-02 PROCEDURE — 250N000011 HC RX IP 250 OP 636: Performed by: STUDENT IN AN ORGANIZED HEALTH CARE EDUCATION/TRAINING PROGRAM

## 2024-07-02 PROCEDURE — 250N000013 HC RX MED GY IP 250 OP 250 PS 637: Performed by: STUDENT IN AN ORGANIZED HEALTH CARE EDUCATION/TRAINING PROGRAM

## 2024-07-02 PROCEDURE — 250N000012 HC RX MED GY IP 250 OP 636 PS 637: Performed by: INTERNAL MEDICINE

## 2024-07-02 PROCEDURE — 99222 1ST HOSP IP/OBS MODERATE 55: CPT | Performed by: NURSE PRACTITIONER

## 2024-07-02 RX ORDER — OXYCODONE HYDROCHLORIDE 5 MG/1
5 TABLET ORAL EVERY 4 HOURS PRN
Qty: 20 TABLET | Refills: 0 | Status: SHIPPED | OUTPATIENT
Start: 2024-07-02

## 2024-07-02 RX ORDER — METHOCARBAMOL 500 MG/1
500 TABLET, FILM COATED ORAL EVERY 6 HOURS PRN
Qty: 30 TABLET | Refills: 0 | Status: SHIPPED | OUTPATIENT
Start: 2024-07-02

## 2024-07-02 RX ORDER — AMOXICILLIN 250 MG
1-2 CAPSULE ORAL 2 TIMES DAILY
Qty: 28 TABLET | Refills: 0 | Status: SHIPPED | OUTPATIENT
Start: 2024-07-02 | End: 2024-07-09

## 2024-07-02 RX ADMIN — ACETAMINOPHEN 975 MG: 325 TABLET, FILM COATED ORAL at 02:40

## 2024-07-02 RX ADMIN — DEXAMETHASONE SODIUM PHOSPHATE 4 MG: 4 INJECTION, SOLUTION INTRAMUSCULAR; INTRAVENOUS at 02:40

## 2024-07-02 RX ADMIN — DEXAMETHASONE SODIUM PHOSPHATE 4 MG: 4 INJECTION, SOLUTION INTRAMUSCULAR; INTRAVENOUS at 08:28

## 2024-07-02 RX ADMIN — OXYCODONE HYDROCHLORIDE 5 MG: 5 TABLET ORAL at 07:35

## 2024-07-02 RX ADMIN — METHOCARBAMOL 500 MG: 500 TABLET ORAL at 07:35

## 2024-07-02 RX ADMIN — CEFAZOLIN SODIUM 2 G: 2 INJECTION, SOLUTION INTRAVENOUS at 02:40

## 2024-07-02 RX ADMIN — HYDROMORPHONE HYDROCHLORIDE 0.2 MG: 0.2 INJECTION, SOLUTION INTRAMUSCULAR; INTRAVENOUS; SUBCUTANEOUS at 02:40

## 2024-07-02 RX ADMIN — INSULIN ASPART 2 UNITS: 100 INJECTION, SOLUTION INTRAVENOUS; SUBCUTANEOUS at 08:30

## 2024-07-02 ASSESSMENT — ACTIVITIES OF DAILY LIVING (ADL)
ADLS_ACUITY_SCORE: 20

## 2024-07-02 NOTE — CONSULTS
[facility]  Consult Note - Hospitalist Service  Date of Admission:  7/1/2024  Consult Requested by: Dr. Henry  Reason for Consult: medical comanagement    Assessment & Plan   Mago Garzon  is a 63 yo woman with history of bilateral C6 radiculopathy d/t foraminal stenosis, now s/p C5-6 ACDF with Dr. Henry, type II diabetes, bilateral knee osteoarthritis, hyperlipidemia, hypertension and MDD who is s/p cervical 5 to cervical 6 anterior cervical discectomy and fusion.  We were consulted for diabetic management.      Bilateral C6 radiculopathy d/t foraminal stenosis, now s/p C5-6 ACDF with Dr. Henry (7/1/2024)  POD #1.   - Orthopedic Surgery is managing.   --Analgesic/pain management, DVT prophylaxis, PT/OT consultation per Ortho.    - HGB check on POD #1 -- 13.9  - Encourage utilization of incentive spirometer.   - Recommend limiting decadron as soon as possible given type II DM.   - PTA gabapentin and Voltaren gel held per surgery, resume per surgery     Type II DM, well controlled, without insulin  Recent Labs   Lab Test 07/01/24  1641   A1C 6.8*   .  PTA regimen includes: type II diabetes on glargine twice daily, Jardiance 10 mg daily, semaglutide once weekly  - Changed to diabetic diet  - Note: placed on decadron 4 mg q 6 hours, suspect AM hyperglycemia is related to decardon administration. Recommend limiting decadron as soon as possible.  - Medium sliding scale insulin ordered  - Continue PTA Lantus dose 18 units in the PM, 25 units in the PM.    - Holding Jardiance and Ozempic, resume at discharge   - Glucose checks QID.    - Hypoglycemic protocol in place.      Osteoarthritis, bilateral knees   - Surgery holding PTA Voltaren gel and gabapentin, resume per surgery     Hyperlipidemia   - Continue PTA rosuvastatin     Hypertension   Mildly hypertensive this morning, suspect 2/2 pain   - Monitor   - Patient keeping BP journal at home; has discussed adding on ACE inhibitor or ARB with PCP  -  "Has follow-up planned with PCP in 3 months for BP recheck     MDD   - Continue PTA Zoloft 100 mg daily    - PTA alprazolam BID PRN for anxiety held per surgery, recommend resuming at discharge.     Patient is clear to discharge from hospitalist perspective.      The patient's care was discussed with the Attending Physician, Dr. Massey, Bedside Nurse, and Patient.    Clinically Significant Risk Factors Present on Admission                             # DMII: A1C = 6.8 % (Ref range: <5.7 %) within past 6 months    # Obesity: Estimated body mass index is 34.7 kg/m  as calculated from the following:    Height as of this encounter: 1.6 m (5' 3\").    Weight as of this encounter: 88.9 kg (195 lb 14.4 oz).              MARJ [facility]it[facility]  Hospitalist Service  Securely message with [employer] (more info)  Text page via [facility] Paging/Directory   ______________________________________________________________________    Chief Complaint   S/p C5-6 ACDF, no complaints this morning. Dressed and looking forward to going home.     History is obtained from the patient and electronic health record    History of Present Illness   Mago Garzon  is a 63 yo woman with history of bilateral C6 radiculopathy d/t foraminal stenosis, now s/p C5-6 ACDF with Dr. Henry, type II diabetes, bilateral knee osteoarthritis, hyperlipidemia, hypertension and MDD who is s/p cervical 5 to cervical 6 anterior cervical discectomy and fusion. The hospitalist service was consulted for diabetic management.     The patient is s/p C5-6 ACDF with Dr. Henry on 7/1/2024 under general anesthesia. No surgical complications noted, EBL 10 ml. Upon my evaluation this morning, patient has completed post-op x-rays and is dressed and ready to go home. She denies pain, denies nausea, vomiting, chest pain, shortness of breath. We discussed her elevated blood sugars, which I suspect is secondary to the steroid administration. Her sugars are well controlled at home. She is " looking forward to getting home and seeing her puppy. She has follow-up with her PCP for repeat BP and A1c in 3 months.     Past Medical History    Past Medical History:   Diagnosis Date    Arthritis     Cervicalgia     Diabetes (H)     Hyperlipidemia     MDD (major depressive disorder)        Past Surgical History   Past Surgical History:   Procedure Laterality Date    ABDOMEN SURGERY          GYN SURGERY      D&C, Tubal Ligation,    HYSTERECTOMY         Medications   Medications Prior to Admission   Medication Sig Dispense Refill Last Dose    ALPRAZolam (XANAX) 0.25 MG tablet Take 0.25 mg by mouth 2 times daily as needed for anxiety   More than a month    empagliflozin (JARDIANCE) 10 MG TABS tablet Take 10 mg by mouth daily   2024    gabapentin (NEURONTIN) 300 MG capsule Take 600 mg by mouth at bedtime   2024 at hs-weaning off    insulin glargine (LANTUS PEN) 100 UNIT/ML pen Inject 18 Units Subcutaneous every morning   2024 at am    insulin glargine (LANTUS PEN) 100 UNIT/ML pen Inject 25 Units Subcutaneous at bedtime   2024 at pm    magnesium oxide 400 MG CAPS Take 1 capsule by mouth daily   Past Week    medical cannabis (Patient's own supply) Take 1 Dose by mouth See Admin Instructions (The purpose of this order is to document that the patient reports taking medical cannabis.  This is not a prescription, and is not used to certify that the patient has a qualifying medical condition.)   2024 at Not Medical    rosuvastatin (CRESTOR) 20 MG tablet Take 20 mg by mouth every evening   2024    Semaglutide, 1 MG/DOSE, (OZEMPIC) 4 MG/3ML pen Inject 1 mg Subcutaneous every 7 days   2024    sertraline (ZOLOFT) 100 MG tablet Take 100 mg by mouth daily   2024    [DISCONTINUED] diclofenac (VOLTAREN) 1 % topical gel Apply topically 4 times daily as needed   2024          Review of Systems    The 10 point Review of Systems is negative other than noted in the HPI or  here.    Social History   I have reviewed this patient's social history and updated it with pertinent information if needed.  Social History     Tobacco Use    Smoking status: Former     Types: Cigarettes    Smokeless tobacco: Never   Vaping Use    Vaping status: Never Used   Substance Use Topics    Alcohol use: Yes     Comment: 5 drinks weekly    Drug use: Yes     Types: Marijuana     Comment: medical Marijuana       Allergies   Allergies   Allergen Reactions    Metformin Nausea    Penicillins Hives        Physical Exam   Vital Signs: Temp: 98.1  F (36.7  C) Temp src: Oral BP: (!) 146/80 Pulse: 68   Resp: 18 SpO2: 95 % O2 Device: None (Room air) Oxygen Delivery: 1 LPM  Weight: 195 lbs 14.4 oz    Physical Exam  Vitals and nursing note reviewed.   Constitutional:       General: She is not in acute distress.  HENT:      Mouth/Throat:      Mouth: Mucous membranes are moist.   Cardiovascular:      Rate and Rhythm: Normal rate and regular rhythm.      Pulses: Normal pulses.      Heart sounds: Normal heart sounds.   Pulmonary:      Effort: Pulmonary effort is normal.      Breath sounds: Normal breath sounds.   Musculoskeletal:         General: Normal range of motion.      Cervical back: Neck supple.   Skin:     General: Skin is warm and dry.   Neurological:      Mental Status: She is alert and oriented to person, place, and time.           Medical Decision Making       35 MINUTES SPENT BY ME on the date of service doing chart review, history, exam, documentation & further activities per the note.      Data     I have personally reviewed the following data over the past 24 hrs:    12.1 (H)  \   13.9   / 208     138 101 17.6 /  288 (H); 288 (H)   4.6 24 0.76 \     ALT: 21 AST: 16 AP: 85 TBILI: 0.8   ALB: 4.2 TOT PROTEIN: 7.2 LIPASE: N/A     TSH: N/A T4: N/A A1C: 6.8 (H)       Imaging results reviewed over the past 24 hrs:   Recent Results (from the past 24 hour(s))   XR Surgery BHUPENDRA L/T 5 Min Fluoro w Stills    Narrative     XR SURGERY BHUPENDRA FLUORO LESS THAN 5 MIN W STILLS  7/1/2024 12:51 PM     COMPARISON: None.    HISTORY: Cervical fusion    NUMBER OF IMAGES ACQUIRED: 2    VIEWS: Frontal and lateral    FLUOROSCOPY TIME: .7 minutes.      Impression    IMPRESSION: Intraoperative fluoroscopy for localization. C5-C6 ACDF  with interbody disc graft. See operative note for further details.    AYAZ WONG MD         SYSTEM ID:  [id number]   XR Cervical Spine 2/3 Views    Narrative    XR CERVICAL SPINE 2/3 VIEWS 7/2/2024 9:03 AM     HISTORY: S/p cervical fusion    COMPARISON: 7/1/2024 intraoperative fluoroscopy       Impression    IMPRESSION: Alignment is within normal limits. C5-C6 ACDF with  interbody disc graft. No acute hardware failure. Vertebral body  heights are maintained. Mild disc space height loss at C6-C7 with a  disc osteophyte complex. Mild multilevel facet arthropathy. Expected  postoperative prevertebral soft tissue edema.     AYAZ WONG MD         SYSTEM ID:  [id number]

## 2024-07-02 NOTE — DISCHARGE SUMMARY
ORTHOPAEDIC DISCHARGE SUMMARY     Date of Admission: 7/1/2024  Date of Discharge: 7/2/2024 11:16 AM  Disposition: Home  Surgeon: Joey Henry MD      DISCHARGE DIAGNOSIS:  Radiculopathy, cervical [M54.12]    PROCEDURES: Procedure(s):  Cervical 5 to Cervical 6 Anterior Cervical Discectomy and Fusion on 7/1/2024    BRIEF HISTORY:  This was a planned admission after the above elective procedure.    HOSPITAL COURSE:    Surgery was uncomplicated. Mago Garzon has done well post-operatively. Medicine was consulted post operatively to aid in management of medical comorbidities. See final recommendations below.     The patient received routine nursing cares and is medically stable. Vital signs are stable. The patient is tolerating a regular diet without GI distress/nausea or vomiting. Voiding spontaneously. All PT/OT goals have been met for safe mobility. Pain is now controlled on oral medications which will be available on discharge. Stool softeners have been used while taking pain medications to help prevent constipation. Mago Garzon is deemed medically safe to discharge.     Antibiotics:  Given periop and 24 hours postop.  PT Progress:  Has met PT/OT goals for safe mobility.   Pain Meds:  Weaned off all IV pain meds by discharge.  Inpatient Events: No significant events or complications.     Discharge orders and instructions as below.    FOLLOWUP:    Follow up in 2 weeks in Dr. Henry's clinic  Follow up with PCP for post-operative follow up of DM, HTN, and other medical comorbidities.         PLANNED DISCHARGE ORDERS:     DVT Prophylaxis: Mobilization        Discharge Medication List as of 7/2/2024 10:25 AM        START taking these medications    Details   methocarbamol (ROBAXIN) 500 MG tablet Take 1 tablet (500 mg) by mouth every 6 hours as needed for muscle spasms, Disp-30 tablet, R-0, E-Prescribe      oxyCODONE (ROXICODONE) 5 MG tablet Take 1 tablet (5 mg) by mouth every 4 hours as needed for moderate to severe  pain, Disp-20 tablet, R-0, E-Prescribe      senna-docusate (SENOKOT-S/PERICOLACE) 8.6-50 MG tablet Take 1-2 tablets by mouth 2 times daily for 7 days May discontinue once bowel movements become regular, Disp-28 tablet, R-0, E-Prescribe           CONTINUE these medications which have NOT CHANGED    Details   ALPRAZolam (XANAX) 0.25 MG tablet Take 0.25 mg by mouth 2 times daily as needed for anxiety, Historical      empagliflozin (JARDIANCE) 10 MG TABS tablet Take 10 mg by mouth daily, Historical      gabapentin (NEURONTIN) 300 MG capsule Take 600 mg by mouth at bedtime, Historical      !! insulin glargine (LANTUS PEN) 100 UNIT/ML pen Inject 18 Units Subcutaneous every morning, Historical      !! insulin glargine (LANTUS PEN) 100 UNIT/ML pen Inject 25 Units Subcutaneous at bedtime, Historical      magnesium oxide 400 MG CAPS Take 1 capsule by mouth daily, Historical      medical cannabis (Patient's own supply) Take 1 Dose by mouth See Admin Instructions (The purpose of this order is to document that the patient reports taking medical cannabis.  This is not a prescription, and is not used to certify that the patient has a qualifying medical condition.), Histori calvin      rosuvastatin (CRESTOR) 20 MG tablet Take 20 mg by mouth every evening, Historical      Semaglutide, 1 MG/DOSE, (OZEMPIC) 4 MG/3ML pen Inject 1 mg Subcutaneous every 7 days, Historical      sertraline (ZOLOFT) 100 MG tablet Take 100 mg by mouth daily, Historical       !! - Potential duplicate medications found. Please discuss with provider.        STOP taking these medications       diclofenac (VOLTAREN) 1 % topical gel Comments:   Reason for Stopping:                 Discharge Procedure Orders   Notify Provider   Order Comments: Signs and symptoms of infection: Fever greater than 101, redness, swelling, heat at site, drainage, or pus     Discharge Instructions - Contact surgery team   Order Comments: Contact surgical team with any new swelling or  tightness to the throat/incision site if it is causing discomfort.     Call 911 if it becomes difficult to breath as this is a medical emergency.     Discharge Instructions - Shower with incision NOT covered   Order Comments: You may shower 1 day after surgery.  You do not need to cover your surgical incision in the shower and may allow water and soap to run over top of the incision. Do not soak or submerge the incision underwater.     Discharge Instructions - No tub bathing   Order Comments: Tub bathing, swimming, or any other activities that will cause your incision to be submerged in water should be avoided until allowed by your Provider.     Discharge Instructions - Diet   Order Comments: Diet as tolerated. Return to diet before surgery. Begin with small bites/sips and soft foods. You may progress your diet as tolerated.     Discharge Instructions - Lifting Limit (specify)   Order Comments: Lifting limit of 5-10 pounds until seen at Post-op follow up appointment.     Return to Clinic - in 2 weeks   Order Comments: Return to Clinic in 2 weeks     No Aspirin or NSAID products   Order Comments: No aspirin or non-steroidal anti-inflammatory drugs (NSAIDs) such as ibuprofen or naproxen until cleared at post-operative appointment     No driving or operating machinery while in a cervical collar   Order Comments: Until follow-up appointment.     Reason for your hospital stay   Order Comments: C5-6 Anterior Cervical Discectomy and Fusion     Follow-up and recommended labs and tests    Order Comments: Follow up in 2 weeks in Dr. Henry's clinic  Follow up with PCP for post-operative check in as needed     Activity   Order Comments: Your activity upon discharge: weight bear as tolerated. See formal activity restrictions in discharge instructions.     Order Specific Question Answer Comments   Is discharge order? Yes      Discharge Instructions   Order Comments: Care after Spine Surgery - Dr. Joey Henry     The following  information will help you through your recovery at home.     Pain  - It is normal for you to experience some pain in the area of your incision after your surgery, and often between the shoulder blades.  This will improve over the coming couple of weeks. You may use ice and/or heat on your shoulders as tolerated. Do not place ice or heat directly on your incision or near your incision site.     - You should call Dr. Henry's office if arm pain returns suddenly and does not improve over 24 hours.     Bracing:  - You should have received a soft cervical collar during the hospital.  You may wear this as needed for comfort, but you do not need to wear it if you do not find it comfortable.     Home Medications  - If you take a blood thinner (e.g. aspirin, warfarin, Xeralto, Eliquis, etc...) at baseline, wait until two days after the operation to start taking it again. This is to lower the risk of a blood collection pushing on the nerves in the surgery site.  If after starting your blood thinner again, if you notice severe worsening neck swelling or arm pain, contact Dr. Henry's office immediately, as this could represent a blood collection forming.     Activity  - You may increase your activity as tolerated; walking is the best form of exercise after spine surgery.    - For six weeks after surgery avoid bending, lifting, and twisting (BLT).  Avoid activities such as vacuuming, raking and shoveling.   - Try to limit your lifting to 5-10 lbs during the first 2 weeks and then increase to 20-25lbs over the next 6 weeks.  - You may return to work approximately 1- 2 weeks after your surgery if you have a sedentary or desk type job.  If you have a physical job Dr. Henry and his staff will help you determine a return to work plan.    - You may resume sexual activity when you feel ready.  Stop if you have pain.     Driving  - You may drive if you feel strong enough, can comfortably rotate your neck, and are not taking any narcotic  pain medications. If uncertain, wait until your 2-week post operative assessment.      Incision Site   - Keep a dressing over your incision for the first week. This dressing is waterproof, and you may shower with it in place. If the dressing you left the hospital with remains intact and in place, with no water beneath it, you may leave this on.  If water gets underneath it, you should remove it and replace it with a dry gauze dressing (can place a tegaderm or tape over the top of the gauze).  Wash hands prior to changing the dressing. If after 1 week there is no drainage on dressing, you may leave the incision open to air.  If there is ongoing drainage five days after surgery, please contact Dr Henry's office.     Pain Management   - Take your prescribed pain medication as needed and directed.  You may use Tylenol and methocarbamol (muscle relaxant) for baseline pain control. You may use the oxycodone for breakthrough pain. Plan to wean off of oxycodone over the next several days. You may continue to use Tylenol and methocarbamol for your discomfort when you no longer need the narcotic pain medication.     - If you have had a fusion, do NOT use ibuprofen, meloxicam, naproxen, or other NSAIDs unless cleared by Dr. Henry's team, as these types of  medications can inhibit your body's ability to fuse properly.      - If you need a refill on your pain medication call [phone]. Please allow 24 hours for your prescription to be refilled. Dr. Henry's office does not refill pain medications on Friday afternoons.    Diet   - Start with eating soft foods and taking small bites. Gradually progress your diet as tolerated.   - Eat a healthy diet; this will help your recovery.  - Drink plenty of fluids, water, milk or juice.  - Take your prescribed stool softener as directed.   - If you have trouble with constipation you should eat more fiber, drink more fluids, increase your walking or try an over the counter laxative.      Follow-up Visits  - You will see Janki Friedman PA-C for your 2-week post-operative follow up appointment. You will see Dr. Henry for your 6-week post-operative follow up appointment. You should have had both of these appointments scheduled for you at the same time your surgery was scheduled. If you did not, please call Dr. Henry's office when you get home from your surgery.  - Write down any questions you have about your surgery, recovery, return to work and other topics you wished to be covered at your post-op visit.  This way, we will be able to address all of your questions at your next visit.  - Call Dr. Henry's office if you have any questions or concerns.     When to Call your Doctor:  - If you have any redness, warmth or swelling at the incision site.  - If your incision opens up.  - If you have increasing drainage from your incision.  - If you have a temperature greater than 100.5 degrees Fahrenheit.  - If you develop dramatic swelling in the front of your neck.  If this swelling is making it difficult to breathe, go immediately to the emergency department, by ambulance if necessary.     Diet   Order Comments: Follow this diet upon discharge:     Order Specific Question Answer Comments   Is discharge order? Yes            Janki Friedman PA-C  Orthopaedic Spine Surgery  [facility]

## 2024-07-02 NOTE — PLAN OF CARE
DATE & TIME: 7/1/24 3pm-11pm    Cognitive Concerns/ Orientation : A&Ox4   BEHAVIOR & AGGRESSION TOOL COLOR: green  ABNL VS/O2: VSS/Room air  MOBILITY: Independent  PAIN MANAGMENT: PRN oxycodone, PRN robaxin and scheduled tylenol  DIET: mod carb  BOWEL/BLADDER: continent  ABNL LAB/BG:   DRAIN/DEVICES:  R PIV  SKIN: anterior neck incision drsg C/D/I  TESTS/PROCEDURES: POD #0 C5 to C6 cervical discectomy and fusion  D/C DATE: tomorrow to home

## 2024-07-02 NOTE — PROGRESS NOTES
"Ortho Progress Note     Subjective:  No acute overnight events.  Did not sleep especially well due to posterior cervical pain.  Notes resolution of preoperative radicular symptoms.  Some pain with swallowing, but able to tolerate diet well.    Objective:  /80   Pulse 72   Temp 98.1  F (36.7  C) (Oral)   Resp 18   Ht 1.6 m (5' 3\")   Wt 88.9 kg (195 lb 14.4 oz)   SpO2 95%   BMI 34.70 kg/m    Gen: alert and appropriately interactive, no acute distress  CV: visible skin appears well-perfused, extremities warm to touch   Resp: breathing equal and non-labored, no wheezing  MSK:       Spine:   Skin: Surgical dressing CDI without evidence of infection or hematoma   Sensation:      R       L    C5:   Intact   Intact    C6:     Intact   Intact    C7:   Intact   Intact    C8:   Intact   Intact     Motor:     R L    C5: Deltoid   5  5    C6:   Biceps    5  5    C7: Triceps    5  5    C8:     5  5    T1: Intrinsics  5 5        Assessment/Plan:  POD 1 s/p ACDF C5-6 performed 7/01. Recovering as expected.    Goals of the day: mobilization/evaluation with PT/OT, xrays when able, discharge home.       -Activity:                                       Up with assist  -Weight Bearing Status:            WBAT   -Bracing:                                      Soft collar PRN, may remove for comfort as desired  -Antibiotics:                                  Ancef x24h  -Anticoagulation:                        SCDs only  -Pain control:                               IV and PO, wean to PO as able  -Dressing:                                     Ok to shower with dressing in place, dressing ok to get wet.  -Diet:                                              ADAT  -Imaging:                                      XR C spine prior to discharge     -Disposition:                                 Pending PT, anticipate discharge later today     -Follow up:                                   2 weeks in clinic    Joey Henry, " MD  Orthopedic Spine Surgery  [facility]

## 2024-07-02 NOTE — PLAN OF CARE
Goal Outcome Evaluation:    DATE & TIME: 7/1-2/24 [id number]      POD #1 C5 to C6 cervical discectomy and fusion                Cognitive Concerns/ Orientation : A&Ox4   BEHAVIOR & AGGRESSION TOOL COLOR: green  ABNL VS/O2: VSS on 1L NC  MOBILITY: Independent  PAIN MANAGMENT: PRN dilaudid  x1  DIET: mod carb  BOWEL/BLADDER: continent  ABNL LAB/BG:   DRAIN/DEVICES:  R PIV SL  SKIN: anterior neck incision drsg C/D/I  D/C DATE: tomorrow to home

## (undated) DEVICE — DRSG TEGADERM 2 1/2X 2 3/4"

## (undated) DEVICE — GLOVE BIOGEL PI MICRO INDICATOR UNDERGLOVE SZ 7.0 48970

## (undated) DEVICE — SPONGE KITTNER 30-101

## (undated) DEVICE — ESU ELEC BLADE 2.75" COATED/INSULATED E1455

## (undated) DEVICE — ESU GROUND PAD UNIVERSAL W/O CORD

## (undated) DEVICE — SYR EAR BULB 3OZ 0035830

## (undated) DEVICE — SU VICRYL 2-0 CT-2 27" UND J269H

## (undated) DEVICE — SOL WATER IRRIG 1000ML BOTTLE 2F7114

## (undated) DEVICE — GLOVE BIOGEL PI MICRO INDICATOR UNDERGLOVE SZ 7.5 48975

## (undated) DEVICE — IMM COLLAR CERVICAL MED UNIVERSAL 3X24" 79-83500

## (undated) DEVICE — GLOVE BIOGEL PI MICRO SZ 6.5 48565

## (undated) DEVICE — DRAPE MAYO STAND 23X54 8337

## (undated) DEVICE — DRAPE STERI TOWEL LG 1010

## (undated) DEVICE — RX SURGIFLO HEMOSTATIC MATRIX W/THROMBIN 8ML 2994

## (undated) DEVICE — DECANTER BAG 2002S

## (undated) DEVICE — STRAP POSITIONING VELCRO 13' CERVICAL HARNESS 920877

## (undated) DEVICE — DRAPE MICROSCOPE LEICA 54X150" AR8033650

## (undated) DEVICE — GLOVE BIOGEL PI MICRO SZ 7.0 48570

## (undated) DEVICE — PACK SPINE SM CUSTOM SNE15SSFSK

## (undated) DEVICE — SU VICRYL 3-0 SH 27" UND J416H

## (undated) DEVICE — NDL SPINAL 18GA 3.5" 405184

## (undated) DEVICE — Device

## (undated) DEVICE — PREP CHLORAPREP W/ORANGE TINT 10.5ML 260715

## (undated) DEVICE — SPONGE SURGIFOAM 100 1974

## (undated) DEVICE — TUBING SUCTION SOFT 20'X3/16" 0036570

## (undated) DEVICE — SU MONOCRYL 4-0 PS-2 18" UND Y496G

## (undated) DEVICE — PIN DISTRACTION ANCHOR FOR SCR 14MM MDS9091414

## (undated) DEVICE — MANIFOLD NEPTUNE 4 PORT 700-20

## (undated) DEVICE — LINEN TOWEL PACK X5 5464

## (undated) DEVICE — SU SILK 2-0 FSL 18" 677G

## (undated) DEVICE — TAPE DURAPORE 3" SILK 1538-3

## (undated) DEVICE — TOOL DISSECT MIDAS MR8 12CM SP MATCH SYM-TRI MR8-SP12MH30T

## (undated) DEVICE — DRSG DRAIN 4X4" 7086

## (undated) DEVICE — BLADE KNIFE SURG 15 371115

## (undated) RX ORDER — FENTANYL CITRATE 50 UG/ML
INJECTION, SOLUTION INTRAMUSCULAR; INTRAVENOUS
Status: DISPENSED
Start: 2024-07-01

## (undated) RX ORDER — HYDROMORPHONE HCL IN WATER/PF 6 MG/30 ML
PATIENT CONTROLLED ANALGESIA SYRINGE INTRAVENOUS
Status: DISPENSED
Start: 2024-07-01

## (undated) RX ORDER — HYDRALAZINE HYDROCHLORIDE 20 MG/ML
INJECTION INTRAMUSCULAR; INTRAVENOUS
Status: DISPENSED
Start: 2024-07-01

## (undated) RX ORDER — GABAPENTIN 100 MG/1
CAPSULE ORAL
Status: DISPENSED
Start: 2024-07-01

## (undated) RX ORDER — DEXAMETHASONE SODIUM PHOSPHATE 4 MG/ML
INJECTION, SOLUTION INTRA-ARTICULAR; INTRALESIONAL; INTRAMUSCULAR; INTRAVENOUS; SOFT TISSUE
Status: DISPENSED
Start: 2024-07-01